# Patient Record
Sex: MALE | Race: WHITE | URBAN - METROPOLITAN AREA
[De-identification: names, ages, dates, MRNs, and addresses within clinical notes are randomized per-mention and may not be internally consistent; named-entity substitution may affect disease eponyms.]

---

## 2017-02-06 ENCOUNTER — OFFICE VISIT (OUTPATIENT)
Dept: UROLOGY | Facility: CLINIC | Age: 42
End: 2017-02-06
Payer: COMMERCIAL

## 2017-02-06 VITALS — OXYGEN SATURATION: 97 % | HEART RATE: 76 BPM | WEIGHT: 219 LBS | BODY MASS INDEX: 31.35 KG/M2 | HEIGHT: 70 IN

## 2017-02-06 DIAGNOSIS — N45.1 EPIDIDYMITIS: ICD-10-CM

## 2017-02-06 DIAGNOSIS — N50.819 TESTICLE PAIN: Primary | ICD-10-CM

## 2017-02-06 LAB
ALBUMIN UR-MCNC: NEGATIVE MG/DL
APPEARANCE UR: CLEAR
BILIRUB UR QL STRIP: NEGATIVE
COLOR UR AUTO: YELLOW
GLUCOSE UR STRIP-MCNC: NEGATIVE MG/DL
HGB UR QL STRIP: ABNORMAL
KETONES UR STRIP-MCNC: NEGATIVE MG/DL
LEUKOCYTE ESTERASE UR QL STRIP: NEGATIVE
NITRATE UR QL: NEGATIVE
PH UR STRIP: 5.5 PH (ref 5–7)
RBC #/AREA URNS AUTO: <1 /HPF (ref 0–2)
SP GR UR STRIP: 1.01 (ref 1–1.03)
URN SPEC COLLECT METH UR: ABNORMAL
UROBILINOGEN UR STRIP-ACNC: 0.2 EU/DL (ref 0.2–1)
WBC #/AREA URNS AUTO: 0 /HPF (ref 0–2)

## 2017-02-06 PROCEDURE — 99202 OFFICE O/P NEW SF 15 MIN: CPT | Performed by: UROLOGY

## 2017-02-06 PROCEDURE — 81001 URINALYSIS AUTO W/SCOPE: CPT | Performed by: UROLOGY

## 2017-02-06 ASSESSMENT — PAIN SCALES - GENERAL: PAINLEVEL: MILD PAIN (3)

## 2017-02-06 NOTE — PROGRESS NOTES
[facility]  Main Office: [address]  [address]  [place] [address]       CHIEF COMPLAINT:  Left epididymitis    HISTORY:   This is a 41 year old gentleman who was diagnosed with left epididymitis in December. He had a scrotal ultrasound performed that showed epididymitis in the tail of the left epididymis. He was treated with doxycycline and his symptoms got better. Previously he was having left testicular pain that was radiating down the left leg. Now he says that the left testicle is somewhat more tender than the right side, but that is normal for him. He has no current symptoms or complaints. No history of urinary symptoms or complaints.      PAST MEDICAL HISTORY: No past medical history on file.    PAST SURGICAL HISTORY:   Past Surgical History   Procedure Laterality Date     C repair cruciate ligament,knee  ~     Rt knee ACL repair     Appendectomy open N/A 2015     Procedure: APPENDECTOMY OPEN;  Surgeon: Maria A Harding MD;  Location: [facility] OR     Replace disk cervical anterior N/A 2016     Procedure: REPLACE DISK CERVICAL ANTERIOR;  Surgeon: Jalen Rasmussen MD;  Location: [facility] OR       FAMILY HISTORY:   Family History   Problem Relation Age of Onset     Hypertension Father       at age 65 of rupt cerebral aneurysm     Family History Negative Mother      Born ~1942     DIABETES Maternal Aunt      Blood Disease Other      cousin with hepatitis       SOCIAL HISTORY:   Social History   Substance Use Topics     Smoking status: Never Smoker      Smokeless tobacco: Never Used     Alcohol Use: 0.0 oz/week     0 Standard drinks or equivalent per week      Comment: 6-7 beers weekly          Allergies   Allergen Reactions     No Known Drug Allergies          Current outpatient prescriptions:      multivitamin, therapeutic with minerals (MULTI-VITAMIN) TABS, Take 1 tablet by mouth daily, Disp: , Rfl:      doxycycline (VIBRAMYCIN) 100 MG capsule, Take 1 capsule (100 mg) by mouth 2  "times daily, Disp: 14 capsule, Rfl: 0     oxyCODONE-acetaminophen (PERCOCET) 5-325 MG per tablet, Take 1-2 tablets by mouth every 4 hours as needed for moderate to severe pain, Disp: 90 tablet, Rfl: 0     hydrOXYzine (VISTARIL) 50 MG capsule, Take 1 capsule (50 mg) by mouth 4 times daily as needed (spasm or pain or itching), Disp: 120 capsule, Rfl: 0     methylPREDNISolone (MEDROL DOSEPAK) 4 MG tablet, Follow package instructions, Disp: 21 tablet, Rfl: 0    Review Of Systems:  Skin: negative  Eyes: negative  Ears/Nose/Throat: negative  Respiratory: No shortness of breath, dyspnea on exertion, cough, or hemoptysis  Cardiovascular: negative  Gastrointestinal: negative  Genitourinary: negative  Musculoskeletal: negative  Neurologic: negative  Psychiatric: negative  Hematologic/Lymphatic/Immunologic: negative  Endocrine: negative      PHYSICAL EXAM:    Pulse 76  Ht 1.778 m (5' 10\")  Wt 99.338 kg (219 lb)  BMI 31.42 kg/m2  SpO2 97%  General appearance: In NAD, conversant  HEENT: Normocephalic and atraumatic, anicteric sclera  Cardiovascular: Not examined  Respiratory: normal, non-labored breathing  Gastrointestinal: negative, Abdomen soft, non-tender, and non-distended.   Musculoskeletal: Not Examined  Peripheral Vascular/extremity: No peripheral edema  Skin: Normal temperature, turgor, and texture. No rash  Psychiatric: Appropriate affect, alert and oriented to person, place, and time    Penis: Normal  Scrotal Skin: Normal  Testicles: Normal to palpation bilaterally, no intratesticular masses bilaterally  Epididymis: Normal bilaterally. No tenderness or evidence of epididymitis on the left side. Normal epididymis. No hernia with Valsalva  Lymphatic: Normal inguinal lymph nodes bilaterally  Digital Rectal Exam:     Cystoscopy: Not done      PSA:     UA RESULTS:  Recent Labs   Lab Test  02/06/17   0829   06/23/15   1522   COLOR  Yellow   < >  Straw   APPEARANCE  Clear   < >  Clear   URINEGLC  Negative   < >  Negative "   URINEBILI  Negative   < >  Negative   URINEKETONE  Negative   < >  Negative   SG  1.015   < >  1.004   UBLD  Trace*   < >  Negative   URINEPH  5.5   < >  7.5*   PROTEIN  Negative   < >  Negative   UROBILINOGEN  0.2   < >   --    NITRITE  Negative   < >  Negative   LEUKEST  Negative   < >  Negative   RBCU   --    --   <1   WBCU   --    --   <1    < > = values in this interval not displayed.       Bladder Scan:     Other Labs:      Imaging Studies: I reviewed his ultrasound images. Probable left epididymitis. Normal testicles with no masses. A small amount of fluid surrounding each testicle.      CLINICAL IMPRESSION:   Left epididymitis, now resolved    PLAN:   He presented left epididymitis that is now resolved. His examination is normal today. He was provided reassurance. He will follow-up with me as needed in the future.      Monroe Barclay MD

## 2017-02-06 NOTE — NURSING NOTE
Pt has L testicle pain that moves to his back.  Sometimes it hurts worse than other times.  Pt has tried doxy.  LINDSEY Garcia, CMA

## 2017-02-06 NOTE — Clinical Note
2017      RE: Jim Marrero  [address] [facility] [address]       [facility]  Main Office: [address]  [address]  [place] [address]       CHIEF COMPLAINT:  Left epididymitis    HISTORY:   This is a 41 year old gentleman who was diagnosed with left epididymitis in December. He had a scrotal ultrasound performed that showed epididymitis in the tail of the left epididymis. He was treated with doxycycline and his symptoms got better. Previously he was having left testicular pain that was radiating down the left leg. Now he says that the left testicle is somewhat more tender than the right side, but that is normal for him. He has no current symptoms or complaints. No history of urinary symptoms or complaints.      PAST MEDICAL HISTORY: No past medical history on file.    PAST SURGICAL HISTORY:   Past Surgical History   Procedure Laterality Date     C repair cruciate ligament,knee  ~     Rt knee ACL repair     Appendectomy open N/A 2015     Procedure: APPENDECTOMY OPEN;  Surgeon: Maria A Harding MD;  Location: [facility] OR     Replace disk cervical anterior N/A 2016     Procedure: REPLACE DISK CERVICAL ANTERIOR;  Surgeon: Jalen Rasmussen MD;  Location: [facility] OR       FAMILY HISTORY:   Family History   Problem Relation Age of Onset     Hypertension Father       at age 65 of rupt cerebral aneurysm     Family History Negative Mother      Born ~[birth year]     DIABETES Maternal Aunt      Blood Disease Other      cousin with hepatitis       SOCIAL HISTORY:   Social History   Substance Use Topics     Smoking status: Never Smoker      Smokeless tobacco: Never Used     Alcohol Use: 0.0 oz/week     0 Standard drinks or equivalent per week      Comment: 6-7 beers weekly          Allergies   Allergen Reactions     No Known Drug Allergies          Current outpatient prescriptions:      multivitamin, therapeutic with minerals (MULTI-VITAMIN) TABS, Take 1 tablet by mouth daily, Disp:  ", Rfl:      doxycycline (VIBRAMYCIN) 100 MG capsule, Take 1 capsule (100 mg) by mouth 2 times daily, Disp: 14 capsule, Rfl: 0     oxyCODONE-acetaminophen (PERCOCET) 5-325 MG per tablet, Take 1-2 tablets by mouth every 4 hours as needed for moderate to severe pain, Disp: 90 tablet, Rfl: 0     hydrOXYzine (VISTARIL) 50 MG capsule, Take 1 capsule (50 mg) by mouth 4 times daily as needed (spasm or pain or itching), Disp: 120 capsule, Rfl: 0     methylPREDNISolone (MEDROL DOSEPAK) 4 MG tablet, Follow package instructions, Disp: 21 tablet, Rfl: 0    Review Of Systems:  Skin: negative  Eyes: negative  Ears/Nose/Throat: negative  Respiratory: No shortness of breath, dyspnea on exertion, cough, or hemoptysis  Cardiovascular: negative  Gastrointestinal: negative  Genitourinary: negative  Musculoskeletal: negative  Neurologic: negative  Psychiatric: negative  Hematologic/Lymphatic/Immunologic: negative  Endocrine: negative      PHYSICAL EXAM:    Pulse 76  Ht 1.778 m (5' 10\")  Wt 99.338 kg (219 lb)  BMI 31.42 kg/m2  SpO2 97%  General appearance: In NAD, conversant  HEENT: Normocephalic and atraumatic, anicteric sclera  Cardiovascular: Not examined  Respiratory: normal, non-labored breathing  Gastrointestinal: negative, Abdomen soft, non-tender, and non-distended.   Musculoskeletal: Not Examined  Peripheral Vascular/extremity: No peripheral edema  Skin: Normal temperature, turgor, and texture. No rash  Psychiatric: Appropriate affect, alert and oriented to person, place, and time    Penis: Normal  Scrotal Skin: Normal  Testicles: Normal to palpation bilaterally, no intratesticular masses bilaterally  Epididymis: Normal bilaterally. No tenderness or evidence of epididymitis on the left side. Normal epididymis. No hernia with Valsalva  Lymphatic: Normal inguinal lymph nodes bilaterally  Digital Rectal Exam:     Cystoscopy: Not done      PSA:     UA RESULTS:  Recent Labs   Lab Test  02/06/17   0829   06/23/15   1522   COLOR  " Yellow   < >  Straw   APPEARANCE  Clear   < >  Clear   URINEGLC  Negative   < >  Negative   URINEBILI  Negative   < >  Negative   URINEKETONE  Negative   < >  Negative   SG  1.015   < >  1.004   UBLD  Trace*   < >  Negative   URINEPH  5.5   < >  7.5*   PROTEIN  Negative   < >  Negative   UROBILINOGEN  0.2   < >   --    NITRITE  Negative   < >  Negative   LEUKEST  Negative   < >  Negative   RBCU   --    --   <1   WBCU   --    --   <1    < > = values in this interval not displayed.       Bladder Scan:     Other Labs:      Imaging Studies: I reviewed his ultrasound images. Probable left epididymitis. Normal testicles with no masses. A small amount of fluid surrounding each testicle.      CLINICAL IMPRESSION:   Left epididymitis, now resolved    PLAN:   He presented left epididymitis that is now resolved. His examination is normal today. He was provided reassurance. He will follow-up with me as needed in the future.      Monroe Barclay MD

## 2017-03-06 ENCOUNTER — OFFICE VISIT (OUTPATIENT)
Dept: OPTOMETRY | Facility: CLINIC | Age: 42
End: 2017-03-06
Payer: COMMERCIAL

## 2017-03-06 DIAGNOSIS — H52.4 HYPEROPIA WITH PRESBYOPIA OF BOTH EYES: Primary | ICD-10-CM

## 2017-03-06 DIAGNOSIS — H52.03 HYPEROPIA WITH PRESBYOPIA OF BOTH EYES: Primary | ICD-10-CM

## 2017-03-06 PROCEDURE — 92015 DETERMINE REFRACTIVE STATE: CPT | Performed by: OPTOMETRIST

## 2017-03-06 PROCEDURE — 92004 COMPRE OPH EXAM NEW PT 1/>: CPT | Performed by: OPTOMETRIST

## 2017-03-06 ASSESSMENT — REFRACTION_MANIFEST
OS_ADD: +1.25
OD_AXIS: 030
OD_AXIS: 020
OD_CYLINDER: +0.25
OD_SPHERE: +2.00
OS_SPHERE: +1.00
OS_CYLINDER: +0.25
METHOD_AUTOREFRACTION: 1
OS_AXIS: 177
OD_ADD: +1.25
OD_SPHERE: +1.75
OD_CYLINDER: +0.25
OS_SPHERE: +1.50
OS_CYLINDER: +0.50
OS_AXIS: 178

## 2017-03-06 ASSESSMENT — REFRACTION_WEARINGRX
SPECS_TYPE: SVL
OD_SPHERE: +1.50
OD_AXIS: 021
OS_SPHERE: +1.50
OD_CYLINDER: +0.50

## 2017-03-06 ASSESSMENT — VISUAL ACUITY
OD_SC: 20/25
OS_CC: 20/60
OS_CC: 20/20
METHOD: SNELLEN - LINEAR
OD_CC: 20/200
OS_SC: 20/20
OD_CC: 20/20

## 2017-03-06 ASSESSMENT — CUP TO DISC RATIO
OS_RATIO: 0.45
OD_RATIO: 0.5

## 2017-03-06 ASSESSMENT — CONF VISUAL FIELD
METHOD: COUNTING FINGERS
OS_NORMAL: 1
OD_NORMAL: 1

## 2017-03-06 ASSESSMENT — TONOMETRY
IOP_METHOD: APPLANATION
OS_IOP_MMHG: 16
OD_IOP_MMHG: 17

## 2017-03-06 ASSESSMENT — SLIT LAMP EXAM - LIDS
COMMENTS: NORMAL
COMMENTS: NORMAL

## 2017-03-06 ASSESSMENT — EXTERNAL EXAM - RIGHT EYE: OD_EXAM: NORMAL

## 2017-03-06 ASSESSMENT — EXTERNAL EXAM - LEFT EYE: OS_EXAM: NORMAL

## 2017-03-06 NOTE — MR AVS SNAPSHOT
After Visit Summary   3/6/2017    Jim Marerro    MRN: [id number]           Patient Information     Date Of Birth          1975        Visit Information        Provider Department      3/6/2017 1:30 PM Magi Ayon OD [facility]        Today's Diagnoses     Hyperopia with presbyopia of both eyes    -  1      Care Instructions    Need additional magnification for near/ recommend a progressive adaptive lens /no line        Follow-ups after your visit        Follow-up notes from your care team     Return in about 1 year (around 3/6/2018).      Who to contact     If you have questions or need follow up information about today's clinic visit or your schedule please contact [facility] directly at [phone].  Normal or non-critical lab and imaging results will be communicated to you by [employer], letter or phone within 4 business days after the clinic has received the results. If you do not hear from us within 7 days, please contact the clinic through [employer] or phone. If you have a critical or abnormal lab result, we will notify you by phone as soon as possible.  Submit refill requests through [employer] or call your pharmacy and they will forward the refill request to us. Please allow 3 business days for your refill to be completed.          Additional Information About Your Visit        MyChart Information     [employer] gives you secure access to your electronic health record. If you see a primary care provider, you can also send messages to your care team and make appointments. If you have questions, please call your primary care clinic.  If you do not have a primary care provider, please call [phone] and they will assist you.        Care EveryWhere ID     This is your Care EveryWhere ID. This could be used by other organizations to access your [facility] medical records  [id number]         Blood Pressure from Last 3 Encounters:   11/09/16 136/85    10/25/16 112/70   05/03/16 110/70    Weight from Last 3 Encounters:   02/06/17 99.3 kg (219 lb)   11/09/16 96.2 kg (212 lb)   10/25/16 96.6 kg (213 lb)              We Performed the Following     EYE EXAM (SIMPLE-NONBILLABLE)     REFRACTION        Primary Care Provider Office Phone # Fax #    Edmund Haynes [phone]       [facility] [address]  [facility] [address]        Thank you!     Thank you for choosing [facility]  for your care. Our goal is always to provide you with excellent care. Hearing back from our patients is one way we can continue to improve our services. Please take a few minutes to complete the written survey that you may receive in the mail after your visit with us. Thank you!             Your Updated Medication List - Protect others around you: Learn how to safely use, store and throw away your medicines at www.disposemymeds.org.      Notice  As of 3/6/2017  2:30 PM    You have not been prescribed any medications.

## 2017-03-06 NOTE — PROGRESS NOTES
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  Glasses 3 years        Distance Vision Acuity: Satisfied with vision    Near Vision Acuity: Not satisfied     Eye Comfort: good  Do you use eye drops? :No:  Occupation or Hobbies: Maintenance              Medical, surgical and family histories reviewed and updated 3/6/2017.       OBJECTIVE: See Ophthalmology exam    ASSESSMENT:    ICD-10-CM    1. Hyperopia with presbyopia of both eyes H52.03 EYE EXAM (SIMPLE-NONBILLABLE)    H52.4 REFRACTION      PLAN:     Patient Instructions   Need additional magnification for near/ recommend a progressive adaptive lens /no line

## 2017-10-04 ENCOUNTER — TELEPHONE (OUTPATIENT)
Dept: INTERNAL MEDICINE | Facility: CLINIC | Age: 42
End: 2017-10-04

## 2017-10-04 NOTE — TELEPHONE ENCOUNTER
Jim Marrero is a 42 year old male  who calls with abdominal pain.    NURSING ASSESSMENT:  The pain began 4 month ago.    Pain scale (0-10): 4/10  LMP  was  N/a.  The pain is described as dull and is located LLQ, which is without radiation.  Symptom associated with the abdominal pain: none.  Patient has not had previous abdominal surgery, including none.  Pain is aggravated by Nothing specific, and relieved by drinking fluids.  Allergies:   Allergies   Allergen Reactions     No Known Drug Allergies        MEDICATIONS:   Taking medication(s) as prescribed? No  Taking over the counter medication(s)? No  Any medication side effects? n/a    Any barriers to taking medication(s) as prescribed?  N/A  Medication(s) improving/managing symptoms?  N/A  Medication reconciliation completed: N/A    NURSING PLAN: Nursing advice to patient Recommended appt this week     RECOMMENDED DISPOSITION:  See in 24 hours - 48hrs   Will comply with recommendation: Yes  If further questions/concerns or if symptoms do not improve, worsen or new symptoms develop, call your PCP or [facility] as soon as possible.    Guideline used:  Telephone Triage Protocols for Nurses, Fourth Edition, Irena Parish RN

## 2017-10-05 ENCOUNTER — OFFICE VISIT (OUTPATIENT)
Dept: INTERNAL MEDICINE | Facility: CLINIC | Age: 42
End: 2017-10-05
Payer: COMMERCIAL

## 2017-10-05 VITALS
SYSTOLIC BLOOD PRESSURE: 128 MMHG | TEMPERATURE: 98.1 F | DIASTOLIC BLOOD PRESSURE: 90 MMHG | BODY MASS INDEX: 32.5 KG/M2 | OXYGEN SATURATION: 95 % | HEART RATE: 89 BPM | WEIGHT: 227 LBS | HEIGHT: 70 IN

## 2017-10-05 DIAGNOSIS — R10.9 FLANK PAIN: Primary | ICD-10-CM

## 2017-10-05 LAB
ALBUMIN UR-MCNC: NEGATIVE MG/DL
APPEARANCE UR: CLEAR
BILIRUB UR QL STRIP: NEGATIVE
COLOR UR AUTO: YELLOW
GLUCOSE UR STRIP-MCNC: NEGATIVE MG/DL
HGB UR QL STRIP: NEGATIVE
KETONES UR STRIP-MCNC: NEGATIVE MG/DL
LEUKOCYTE ESTERASE UR QL STRIP: NEGATIVE
NITRATE UR QL: NEGATIVE
PH UR STRIP: 6 PH (ref 5–7)
SOURCE: NORMAL
SP GR UR STRIP: 1.01 (ref 1–1.03)
UROBILINOGEN UR STRIP-ACNC: 0.2 EU/DL (ref 0.2–1)

## 2017-10-05 PROCEDURE — 99213 OFFICE O/P EST LOW 20 MIN: CPT | Performed by: FAMILY MEDICINE

## 2017-10-05 PROCEDURE — 81003 URINALYSIS AUTO W/O SCOPE: CPT | Performed by: FAMILY MEDICINE

## 2017-10-05 NOTE — MR AVS SNAPSHOT
After Visit Summary   10/5/2017    Jim Marrero    MRN: [id number]           Patient Information     Date Of Birth          1975        Visit Information        Provider Department      10/5/2017 4:00 PM Bert Piedra MD [facility]        Today's Diagnoses     Flank pain    -  1       Follow-ups after your visit        Your next 10 appointments already scheduled     Nov 28, 2017 10:20 AM CST   PHYSICAL with Edmund Haynes MD   [facility] ([facility])    [address]  [facility] [id number] [phone]              Who to contact     If you have questions or need follow up information about today's clinic visit or your schedule please contact [facility] directly at [phone].  Normal or non-critical lab and imaging results will be communicated to you by MyChart, letter or phone within 4 business days after the clinic has received the results. If you do not hear from us within 7 days, please contact the clinic through MyChart or phone. If you have a critical or abnormal lab result, we will notify you by phone as soon as possible.  Submit refill requests through [employer] or call your pharmacy and they will forward the refill request to us. Please allow 3 business days for your refill to be completed.          Additional Information About Your Visit        MyChart Information     [employer] gives you secure access to your electronic health record. If you see a primary care provider, you can also send messages to your care team and make appointments. If you have questions, please call your primary care clinic.  If you do not have a primary care provider, please call [phone] and they will assist you.        Care EveryWhere ID     This is your Care EveryWhere ID. This could be used by other organizations to access your [facility] medical records  [id number]        Your Vitals Were     Pulse Temperature  "Height Pulse Oximetry BMI (Body Mass Index)       89 98.1  F (36.7  C) (Oral) 5' 10\" (1.778 m) 95% 32.57 kg/m2        Blood Pressure from Last 3 Encounters:   10/05/17 128/90   11/09/16 136/85   10/25/16 112/70    Weight from Last 3 Encounters:   10/05/17 227 lb (103 kg)   02/06/17 219 lb (99.3 kg)   11/09/16 212 lb (96.2 kg)              We Performed the Following     UA without Microscopic        Primary Care Provider Office Phone # Fax #    Edmund Haynes [phone]       [address] [address]        Equal Access to Services     [facility] KRISHNA : Hadii grayson careyo Solouann, waaxda luqadaha, qaybta kaalmada adeegyada, waxay shirleyin kp medina . So [facility] [phone].    ATENCIÓN: Si habla español, tiene a ruiz disposición servicios gratuitos de asistencia lingüística. Llame al [phone].    We comply with applicable federal civil rights laws and [place] laws. We do not discriminate on the basis of race, color, national origin, age, disability, sex, sexual orientation, or gender identity.            Thank you!     Thank you for choosing [facility]  for your care. Our goal is always to provide you with excellent care. Hearing back from our patients is one way we can continue to improve our services. Please take a few minutes to complete the written survey that you may receive in the mail after your visit with us. Thank you!             Your Updated Medication List - Protect others around you: Learn how to safely use, store and throw away your medicines at www.disposemymeds.org.      Notice  As of 10/5/2017 11:59 PM    You have not been prescribed any medications.      "

## 2017-10-05 NOTE — PROGRESS NOTES
"SUBJECTIVE:   Jim Marrero is a 42 year old male who  presents today for L flank and groin pain. Symptoms of back pain and voiding in small amounts have been going on for 1month(s).  Hematuria no.  gradual onset and still presentand moderate.  There is no history of fever, chills, nausea or vomiting.  No history of vaginal or penile discharge. This patient does not have a history of urinary tract infections. Patient denies rigors, flank pain, temperature > 101 degrees F. and Vomiting, significant nausea or diarrhea or rash   Has mild peritesticular pain on the ipsilateral side and a hx of epididymitis on that side as well    History reviewed. No pertinent past medical history.  No current outpatient prescriptions on file.     Social History   Substance Use Topics     Smoking status: Never Smoker     Smokeless tobacco: Never Used     Alcohol use 0.0 oz/week     0 Standard drinks or equivalent per week      Comment: 6-7 beers weekly       ROS:   CONSTITUTIONAL:NEGATIVE for fever, chills, change in weight  INTEGUMENTARY/SKIN: NEGATIVE for worrisome rashes, moles or lesions    OBJECTIVE:  /90 (BP Location: Left arm, Patient Position: Sitting, Cuff Size: Adult Large)  Pulse 89  Temp 98.1  F (36.7  C) (Oral)  Ht 5' 10\" (1.778 m)  Wt 227 lb (103 kg)  SpO2 95%  BMI 32.57 kg/m2     GENERAL APPEARANCE: healthy, alert and no distress  RESP: lungs clear to auscultation - no rales, rhonchi or wheezes  CV: regular rates and rhythm, normal S1 S2, no murmur noted  ABDOMEN:  soft, nontender, no HSM or masses and bowel sounds normal  BACK: No CVA tenderness  SKIN: no suspicious lesions or rashes  :no inguinal hernias, normal scrotum, penis,  testes normal    ASSESSMENT:   1. Flank pain  Reviewed imaging in the past and noted neg CT for stones.   UA neg today.   Reassure.   Normal urine today.    Consider MRI L spine if persists to r/o radiculopathy.   Nsaids, ice    - UA without Microscopic    "

## 2017-10-05 NOTE — NURSING NOTE
"Chief Complaint   Patient presents with     Abdominal Pain       Initial /90 (BP Location: Left arm, Patient Position: Sitting, Cuff Size: Adult Large)  Pulse 89  Temp 98.1  F (36.7  C) (Oral)  Ht 5' 10\" (1.778 m)  Wt 227 lb (103 kg)  SpO2 95%  BMI 32.57 kg/m2 Estimated body mass index is 32.57 kg/(m^2) as calculated from the following:    Height as of this encounter: 5' 10\" (1.778 m).    Weight as of this encounter: 227 lb (103 kg).  Medication Reconciliation: complete    "

## 2018-02-21 ENCOUNTER — OFFICE VISIT (OUTPATIENT)
Dept: OPTOMETRY | Facility: CLINIC | Age: 43
End: 2018-02-21
Payer: COMMERCIAL

## 2018-02-21 ENCOUNTER — TELEPHONE (OUTPATIENT)
Dept: OPTOMETRY | Facility: CLINIC | Age: 43
End: 2018-02-21

## 2018-02-21 DIAGNOSIS — H52.4 PRESBYOPIA: ICD-10-CM

## 2018-02-21 DIAGNOSIS — H52.03 HYPERMETROPIA OF BOTH EYES: Primary | ICD-10-CM

## 2018-02-21 PROCEDURE — 92014 COMPRE OPH EXAM EST PT 1/>: CPT | Performed by: OPTOMETRIST

## 2018-02-21 PROCEDURE — 92015 DETERMINE REFRACTIVE STATE: CPT | Performed by: OPTOMETRIST

## 2018-02-21 PROCEDURE — 92310 CONTACT LENS FITTING OU: CPT | Performed by: OPTOMETRIST

## 2018-02-21 ASSESSMENT — REFRACTION_WEARINGRX
OS_AXIS: 007
OS_ADD: +1.25
OD_CYLINDER: +0.25
SPECS_TYPE: PAL
OD_AXIS: 005
OD_ADD: +1.25
OS_CYLINDER: +0.25
OD_SPHERE: +1.75
OS_SPHERE: +1.50

## 2018-02-21 ASSESSMENT — VISUAL ACUITY
OS_SC: 20/25
OS_CC: 20/30
CORRECTION_TYPE: GLASSES
OS_CC: 20/20
OD_CC: 20/20
OD_SC: 20/30
OD_CC: 20/40
METHOD: SNELLEN - LINEAR

## 2018-02-21 ASSESSMENT — REFRACTION_CURRENTRX
OS_SPHERE: +1.75
OD_BRAND: ALCON DAILIES AQUA COMFORT PLUS BC 8.7, D 14.0
OD_SPHERE: +2.00
OS_BRAND: ALCON DAILIES AQUA COMFORT PLUS BC 8.7, D 14.0

## 2018-02-21 ASSESSMENT — REFRACTION_MANIFEST
OS_CYLINDER: +0.25
OS_SPHERE: +1.25
OD_ADD: +1.50
OS_CYLINDER: +0.50
OD_SPHERE: +1.75
OS_ADD: +1.50
OD_SPHERE: +1.75
OS_SPHERE: +1.50
OD_CYLINDER: +0.25
OS_AXIS: 175
OS_AXIS: 175
OD_AXIS: 005
METHOD_AUTOREFRACTION: 1

## 2018-02-21 ASSESSMENT — CONF VISUAL FIELD
OD_NORMAL: 1
OS_NORMAL: 1

## 2018-02-21 ASSESSMENT — KERATOMETRY
OS_K2POWER_DIOPTERS: 45.75
OD_K1POWER_DIOPTERS: 44.75
OS_K1POWER_DIOPTERS: 44.87
OD_AXISANGLE_DEGREES: 85
OD_AXISANGLE2_DEGREES: 175
OS_AXISANGLE2_DEGREES: 176
METHOD_AUTO_MANUAL: AUTOMATED
OD_K2POWER_DIOPTERS: 45.12
OS_AXISANGLE_DEGREES: 86

## 2018-02-21 ASSESSMENT — EXTERNAL EXAM - RIGHT EYE: OD_EXAM: NORMAL

## 2018-02-21 ASSESSMENT — CUP TO DISC RATIO
OS_RATIO: 0.45
OD_RATIO: 0.5

## 2018-02-21 ASSESSMENT — TONOMETRY
OS_IOP_MMHG: 16
OD_IOP_MMHG: 16
IOP_METHOD: APPLANATION

## 2018-02-21 ASSESSMENT — EXTERNAL EXAM - LEFT EYE: OS_EXAM: NORMAL

## 2018-02-21 ASSESSMENT — SLIT LAMP EXAM - LIDS
COMMENTS: NORMAL
COMMENTS: NORMAL

## 2018-02-21 NOTE — MR AVS SNAPSHOT
After Visit Summary   2/21/2018    Jim Marrero    MRN: [id number]           Patient Information     Date Of Birth          1975        Visit Information        Provider Department      2/21/2018 1:00 PM Magi Ayon, [facility]        Today's Diagnoses     Hypermetropia of both eyes    -  1    Presbyopia          Care Instructions    Jim Marrero          1975     [id number]     Procedure      Wearing Schedule 1st Week    Wash hands with oil/perfume free soap.   Day 1    4 hours  Cleanse and disinfect contacts daily.    Day 2    6 hours  Clean__NA_______________________   Day 3    8 hours  Rinse__saline is fine to rinse_______________________   Day 4    8 hours  Disinfect______________________    Day 5    10 hours  Rewet__blink contacts, refresh contacts, ______________________    Day 6    10 hours          Day 7    10 hours  Use only eye drops made for contact lenses.  Return in 1-2 weeks for contact check appointment-Come in wearing your contacts.      Replace daily   Sleeping in contacts is NOT recommended.    Sleeping contact lenses increases the risk of contact lens related problems such as but not limited to corneal ulceration,  infiltrates, conjunctivitis, and neovascularization.  Not all contacts are approved for overnight wear.  Make sure you are using your contacts as they are intended.    Congratulations! You have been fitted with contact lenses.  Please follow these simple steps to insure successful contact lens wear.  1.  If your lenses are uncomfortable, cause redness, pain, or blurry vision discontinue wear immediately and call [facility] for an appointment at [phone].  2. Return to Optometry contact lens checks and yearly eye exams.  3. Never wear lenses longer than the prescribed time.  Maximum wearing time of 12  hours a day.  4. Use only prescribed solutions because mixing brands or types may result in problems.  5.  Never wear a torn, discolored, scratched, or chipped lens for any reason.  6. Always follow your doctor and 's recommendations.  7. Use only water-soluble cosmetics, especially mascara.  8. Always have a current pair of eyeglasses available.  9. Do not wear contacts while soaking in a hot tub or while swimming.  10. Only FDA approved extended wear contacts are suitable for sleeping.    I have read and understand all the enclosed material and have been instructed on insertion, removal, and care of my contact lenses.    X_______________________________________________        Return to clinic 1 week wearing a pair of lenses             Follow-ups after your visit        Who to contact     If you have questions or need follow up information about today's clinic visit or your schedule please contact [facility] directly at [phone].  Normal or non-critical lab and imaging results will be communicated to you by BioArrayhart, letter or phone within 4 business days after the clinic has received the results. If you do not hear from us within 7 days, please contact the clinic through TruTag Techn[employer] or phone. If you have a critical or abnormal lab result, we will notify you by phone as soon as possible.  Submit refill requests through Useful Systems or call your pharmacy and they will forward the refill request to us. Please allow 3 business days for your refill to be completed.          Additional Information About Your Visit        Useful Systems Information     Useful Systems gives you secure access to your electronic health record. If you see a primary care provider, you can also send messages to your care team and make appointments. If you have questions, please call your primary care clinic.  If you do not have a primary care provider, please call [phone] and they will assist you.        Care EveryWhere ID     This is your Care EveryWhere ID. This could be used by other organizations to access your [place]  medical records  [id number]         Blood Pressure from Last 3 Encounters:   10/05/17 128/90   11/09/16 136/85   10/25/16 112/70    Weight from Last 3 Encounters:   10/05/17 103 kg (227 lb)   02/06/17 99.3 kg (219 lb)   11/09/16 96.2 kg (212 lb)              Today, you had the following     No orders found for display       Primary Care Provider Office Phone # Fax #    Edmund Haynes [phone]       [address] [facility] [address]  [facility] [address]        Equal Access to Services     [facility]: Hadii grayson johnston hadasho Solouann, waaxda luqadaha, qaybta kaalmada berny, holley medina . So [facility] [place] [phone].    ATENCIÓN: Si habla español, tiene a ruiz disposición servicios gratuitos de asistencia lingüística. [facility] [phone].    We comply with applicable federal civil rights laws and [place] laws. We do not discriminate on the basis of race, color, national origin, age, disability, sex, sexual orientation, or gender identity.            Thank you!     Thank you for choosing [facility]3] [facility]  for your care. Our goal is always to provide you with excellent care. Hearing back from our patients is one way we can continue to improve our services. Please take a few minutes to complete the written survey that you may receive in the mail after your visit with us. Thank you!             Your Updated Medication List - Protect others around you: Learn how to safely use, store and throw away your medicines at www.disposemymeds.org.      Notice  As of 2/21/2018  1:54 PM    You have not been prescribed any medications.

## 2018-02-21 NOTE — PROGRESS NOTES
Chief Complaint   Patient presents with     COMPREHENSIVE EYE EXAM     Contact Lens Evaluation   glasses are getting scratched due to work environment  He is interested in contacts  And is questioning whether he needs his bifocal, can see up close without it      Previous contact lens wearer? No  Comfort of contact lenses :N/A       Satisfied with current lenses: N/A        Last Eye Exam: 1yr  Dilated Previously: Yes    What are you currently using to see?  glasses    Distance Vision Acuity: Satisfied with vision    Near Vision Acuity: Satisfied with vision while reading  with glasses    Eye Comfort: good  Do you use eye drops? : No  Occupation or Hobbies: [employer]           Medical, surgical and family histories reviewed and updated 2/21/2018.       OBJECTIVE: See Ophthalmology exam    ASSESSMENT:    ICD-10-CM    1. Hypermetropia of both eyes H52.03    2. Presbyopia H52.4         PLAN:   Fit with daily contact lenses, patient was able to insert and remove contact lenses in office.   return to clinic for follow up wearing a pair in for the appointment  Use readers as needed over contacts   Finalize prescription at time of follow up    Magi Ayon OD

## 2018-02-21 NOTE — LETTER
2/21/2018         RE: Jim Marrero  [address] [address]        Dear Colleague,    [facility] you [facility] Please see a copy of my visit note below.    Chief Complaint   Patient presents with     COMPREHENSIVE EYE EXAM     Contact Lens Evaluation   glasses are getting scratched due to work environment  He is interested in contacts  And is questioning whether he needs his bifocal, can see up close without it      Previous contact lens wearer? No  Comfort of contact lenses :N/A       Satisfied with current lenses: N/A        Last Eye Exam: 1yr  Dilated Previously: Yes    What are you currently using to see?  glasses    Distance Vision Acuity: Satisfied with vision    Near Vision Acuity: Satisfied with vision while reading  with glasses    Eye Comfort: good  Do you use eye drops? : No  Occupation or Hobbies: [employer]           Medical, surgical and family histories reviewed and updated 2/21/2018.       OBJECTIVE: See Ophthalmology exam    ASSESSMENT:    ICD-10-CM    1. Hypermetropia of both eyes H52.03    2. Presbyopia H52.4         PLAN:   Fit with daily contact lenses, patient was able to insert and remove contact lenses in office.   return to clinic for follow up wearing a pair in for the appointment  Use readers as needed over contacts   Finalize prescription at time of follow up    Magi Ayon OD                     Again, thank you for allowing me to participate in the care of your patient.        Sincerely,        Magi Ayon, OD

## 2018-02-21 NOTE — TELEPHONE ENCOUNTER
2/21/18 Patient successfully performed I&R and was given wearing schedule.  Wojciech Alexander  Optometric Technician

## 2018-02-21 NOTE — PATIENT INSTRUCTIONS
Jim Marrero          1975     [id number]     Procedure      Wearing Schedule 1st Week    Wash hands with oil/perfume free soap.   Day 1    4 hours  Cleanse and disinfect contacts daily.    Day 2    6 hours  Clean__NA_______________________   Day 3    8 hours  Rinse__saline is fine to rinse_______________________   Day 4    8 hours  Disinfect______________________    Day 5    10 hours  Rewet__blink contacts, refresh contacts, ______________________    Day 6    10 hours          Day 7    10 hours  Use only eye drops made for contact lenses.  Return in 1-2 weeks for contact check appointment-Come in wearing your contacts.      Replace daily   Sleeping in contacts is NOT recommended.    Sleeping contact lenses increases the risk of contact lens related problems such as but not limited to corneal ulceration,  infiltrates, conjunctivitis, and neovascularization.  Not all contacts are approved for overnight wear.  Make sure you are using your contacts as they are intended.    Congratulations! You have been fitted with contact lenses.  Please follow these simple steps to insure successful contact lens wear.  1.  If your lenses are uncomfortable, cause redness, pain, or blurry vision discontinue wear immediately and call [facility] for an appointment at [phone].  2. Return to Optometry contact lens checks and yearly eye exams.  3. Never wear lenses longer than the prescribed time.  Maximum wearing time of 12  hours a day.  4. Use only prescribed solutions because mixing brands or types may result in problems.  5. Never wear a torn, discolored, scratched, or chipped lens for any reason.  6. Always follow your doctor and 's recommendations.  7. Use only water-soluble cosmetics, especially mascara.  8. Always have a current pair of eyeglasses available.  9. Do not wear contacts while soaking in a hot tub or while swimming.  10. Only FDA approved extended wear contacts are  suitable for sleeping.    I have read and understand all the enclosed material and have been instructed on insertion, removal, and care of my contact lenses.    X_______________________________________________        Return to clinic 1 week wearing a pair of lenses

## 2018-02-28 ENCOUNTER — OFFICE VISIT (OUTPATIENT)
Dept: OPTOMETRY | Facility: CLINIC | Age: 43
End: 2018-02-28
Payer: COMMERCIAL

## 2018-02-28 DIAGNOSIS — H52.03 HYPERMETROPIA OF BOTH EYES: Primary | ICD-10-CM

## 2018-02-28 PROCEDURE — 92499 UNLISTED OPH SVC/PROCEDURE: CPT | Performed by: OPTOMETRIST

## 2018-02-28 PROCEDURE — 99207 ZZC NO BILLABLE SERVICE THIS VISIT: CPT | Performed by: OPTOMETRIST

## 2018-02-28 ASSESSMENT — REFRACTION_CURRENTRX
OD_SPHERE: +2.00
OS_BRAND: ALCON DAILIES AQUA COMFORT PLUS BC 8.7, D 14.0
OS_SPHERE: +1.75
OD_BRAND: ALCON DAILIES AQUA COMFORT PLUS BC 8.7, D 14.0

## 2018-02-28 NOTE — MR AVS SNAPSHOT
After Visit Summary   2/28/2018    Jim Marrero    MRN: [id number]           Patient Information     Date Of Birth          1975        Visit Information        Provider Department      2/28/2018 5:20 PM Magi Ayon OD [facility]        Today's Diagnoses     Hypermetropia of both eyes    -  1      Care Instructions    You can order your contact lenses online at [email or web address].  Click on services at the top of the page, then eye care and you will see the link to order contact lenses.  You can also order contact lenses at [phone]. There is no shipping fee if you order an annual supply otherwise  be sure to let them know which office you would like to  the lenses, [facility] [phone].    May use readers over contacts +1.25 as needed           Follow-ups after your visit        Follow-up notes from your care team     Return if symptoms worsen or fail to improve.      Your next 10 appointments already scheduled     Feb 28, 2018  5:20 PM CST   Return Visit with Magi Ayon OD   [facility] ([facility])    [address] [place] [address]  [address]  [facility] [address] [phone]              Who to contact     If you have questions or need follow up information about today's clinic visit or your schedule please contact [facility] directly at [phone].  Normal or non-critical lab and imaging results will be communicated to you by MyChart, letter or phone within 4 business days after the clinic has received the results. If you do not hear from us within 7 days, please contact the clinic through MyChart or phone. If you have a critical or abnormal lab result, we will notify you by phone as soon as possible.  Submit refill requests through SiOx or call your pharmacy and they will forward the refill request to us. Please allow 3 business days for your refill to be completed.          Additional  Information About Your Visit        Sleep Solutionshart Information     [employer]able gives you secure access to your electronic health record. If you see a primary care provider, you can also send messages to your care team and make appointments. If you have questions, please call your primary care clinic.  If you do not have a primary care provider, please call [phone] and they will assist you.        Care EveryWhere ID     This is your Care EveryWhere ID. This could be used by other organizations to access your [place] medical records  [id number]         Blood Pressure from Last 3 Encounters:   10/05/17 128/90   11/09/16 136/85   10/25/16 112/70    Weight from Last 3 Encounters:   10/05/17 103 kg (227 lb)   02/06/17 99.3 kg (219 lb)   11/09/16 96.2 kg (212 lb)              Today, you had the following     No orders found for display       Primary Care Provider Office Phone # Fax #    Edmund Haynes [phone]       [address] [facility] [address]  [facility] [address]        Equal Access to Services     [facility]: Hadii aad ku hadasho Soomaali, waaxda luqadaha, qaybta kaalmada adeegyada, waxay idiin haymundon edwige medina . So [facility] [phone].    ATENCIÓN: Si habla español, tiene a ruiz disposición servicios gratuitos de asistencia lingüística. Llame al [phone].    We comply with applicable federal civil rights laws and [place] laws. We do not discriminate on the basis of race, color, national origin, age, disability, sex, sexual orientation, or gender identity.            Thank you!     Thank you for choosing [facility]  for your care. Our goal is always to provide you with excellent care. Hearing back from our patients is one way we can continue to improve our services. Please take a few minutes to complete the written survey that you may receive in the mail after your visit with us. Thank you!             Your Updated Medication List - Protect others around you: Learn how to safely  use, store and throw away your medicines at www.disposemymeds.org.      Notice  As of 2/28/2018  5:19 PM    You have not been prescribed any medications.

## 2018-02-28 NOTE — LETTER
2/28/2018         RE: Jim Marrero  [address] [facility] [address]        Dear Colleague,    Thank you for referring your patient, Jim Marrero, to the [facility]. Please see a copy of my visit note below.    Chief Complaint   Patient presents with     Contact Lens Check     Follow up     Satisfied with contacts:  They get a bit filmy at the end of the day  Bought some rewetting drops and has used   Good comfort:  Yes                Medical, surgical and family histories reviewed and updated 2/28/2018.       OBJECTIVE: See Ophthalmology exam    ASSESSMENT:    ICD-10-CM    1. Hypermetropia of both eyes H52.03     good fit visual acuity with current lens    PLAN:  Use readers over contacts as needed   And rewetting drops as needed  Wear time 10-12 hours    Magi Ayon OD                     Again, thank you for allowing me to participate in the care of your patient.        Sincerely,        Magi Ayon, OD

## 2018-02-28 NOTE — PATIENT INSTRUCTIONS
You can order your contact lenses online at [email or web address].  Click on services at the top of the page, then eye care and you will see the link to order contact lenses.  You can also order contact lenses at [phone]. There is no shipping fee if you order an annual supply otherwise  be sure to let them know which office you would like to  the lenses, [facility] [phone].    May use readers over contacts +1.25 as needed

## 2018-02-28 NOTE — PROGRESS NOTES
Chief Complaint   Patient presents with     Contact Lens Check     Follow up     Satisfied with contacts:  They get a bit filmy at the end of the day  Bought some rewetting drops and has used   Good comfort:  Yes                Medical, surgical and family histories reviewed and updated 2/28/2018.       OBJECTIVE: See Ophthalmology exam    ASSESSMENT:    ICD-10-CM    1. Hypermetropia of both eyes H52.03     good fit visual acuity with current lens    PLAN:  Use readers over contacts as needed   And rewetting drops as needed  Wear time 10-12 hours    Magi Ayon OD

## 2018-04-16 ENCOUNTER — OFFICE VISIT (OUTPATIENT)
Dept: PEDIATRICS | Facility: CLINIC | Age: 43
End: 2018-04-16
Payer: COMMERCIAL

## 2018-04-16 ENCOUNTER — TELEPHONE (OUTPATIENT)
Dept: INTERNAL MEDICINE | Facility: CLINIC | Age: 43
End: 2018-04-16

## 2018-04-16 VITALS
DIASTOLIC BLOOD PRESSURE: 80 MMHG | OXYGEN SATURATION: 98 % | TEMPERATURE: 98.5 F | BODY MASS INDEX: 32.14 KG/M2 | SYSTOLIC BLOOD PRESSURE: 118 MMHG | HEART RATE: 96 BPM | WEIGHT: 224 LBS

## 2018-04-16 DIAGNOSIS — M54.50 LEFT-SIDED LOW BACK PAIN WITHOUT SCIATICA, UNSPECIFIED CHRONICITY: Primary | ICD-10-CM

## 2018-04-16 LAB
ALBUMIN UR-MCNC: NEGATIVE MG/DL
AMORPH CRY #/AREA URNS HPF: ABNORMAL /HPF
APPEARANCE UR: NORMAL
BACTERIA #/AREA URNS HPF: ABNORMAL /HPF
BILIRUB UR QL STRIP: NEGATIVE
COLOR UR AUTO: YELLOW
GLUCOSE UR STRIP-MCNC: NEGATIVE MG/DL
HGB UR QL STRIP: NEGATIVE
KETONES UR STRIP-MCNC: NEGATIVE MG/DL
LEUKOCYTE ESTERASE UR QL STRIP: NEGATIVE
NITRATE UR QL: NEGATIVE
PH UR STRIP: 7 PH (ref 5–7)
RBC #/AREA URNS AUTO: ABNORMAL /HPF
SOURCE: NORMAL
SP GR UR STRIP: 1.02 (ref 1–1.03)
UROBILINOGEN UR STRIP-ACNC: 0.2 EU/DL (ref 0.2–1)
WBC #/AREA URNS AUTO: ABNORMAL /HPF

## 2018-04-16 PROCEDURE — 81001 URINALYSIS AUTO W/SCOPE: CPT | Performed by: INTERNAL MEDICINE

## 2018-04-16 PROCEDURE — 99214 OFFICE O/P EST MOD 30 MIN: CPT | Performed by: INTERNAL MEDICINE

## 2018-04-16 NOTE — MR AVS SNAPSHOT
After Visit Summary   4/16/2018    Jim Marrero    MRN: [id number]           Patient Information     Date Of Birth          1975        Visit Information        Provider Department      4/16/2018 10:00 AM José Miguel Spence MD [facility]        Today's Diagnoses     Left-sided low back pain without sciatica, unspecified chronicity    -  1    Left flank pain          Care Instructions    INSTRUCTIONS FOR TODAY:     schedule visit with PT   follow-up after 3-4 PT sessions if not better     Dr Spence    When You Have Low Back Pain    Caring for Your Back  You are not alone.    Low back pain is very common. Nearly half of all adults have low back pain in any given year. The good news is that back pain is rarely a danger to your health. Most people can manage their back pain on their own and about half of them start feeling better within 2 weeks. In 9 out of 10 cases, low back pain goes away or no longer limits daily activity within 6 weeks.     Your outlook is good!    Your symptoms tell us that your low back pain is most likely not a danger to you. Most of the time we do not know the exact cause of low back pain, even if you see a doctor or have an MRI. However, treatment can still work without knowing the cause of the pain. Less than 1 in 100 people need surgery for their back pain.     What can I do about my low back pain?     There are three things you can do to ease low back pain and help it go away.    Use heat or cold packs.    Take medicine as directed.    Use positions, movements and exercises.     Using heat or cold packs    Try cold packs or gentle heat to ease your pain. Use whichever gives you the most relief. Apply the cold pack or heat for 15 minutes at a time, as often as needed.    Taking medicine      If your doctor has prescribed medicine, be sure to follow the directions.    If you take over-the-counter medicine, read and follow the directions.    Talk to your  doctor if you have any questions.     Using positions, movements and exercises    Research tells us that moving your joints and muscles can help you recover from back pain. Such activity should be simple and gentle. Use the positions in the photos as well as walking to help relieve your pain. Try taking a short walk every 3 to 4 hours during the day. Walk for a few minutes inside your home or take longer walks outside, on a treadmill or at a mall. Slowly increase the amount of time you walk. Expect discomfort when you begin, but it should lessen as your back starts to heal. When your back feels better, walk daily to keep your back and body healthy.    Finding a comfortable position    When your back pain is new, certain positions will ease your pain. Gently try each of the positions below until you find one that is helpful. Once you find a position of comfort, use it as often as you like when you are resting. You will recover faster if you combine rest with activity.         Lie on your back with your legs bent. You can do this by placing a pillow under your knees. Or you may lie on the floor and rest your lower legs on the seat of a chair.       Lie on your side with your knees bent, and place a pillow between your knees.       Lie on your stomach over pillows.      When should I call my doctor?    Your back pain should improve over the first couple of weeks. As it improves, you should be able to return to your normal activities. But call your doctor if:    You have a sudden change in your ability to control your bladder or bowels.    You feel tingling in your groin or legs.    The pain spreads down your leg and into your foot.    Your toes, feet or leg muscles feel weak.    You feel generally unwell or sick.    Your pain does not get better or gets worse.      If you are deaf or hard of hearing, please let us know. We provide many free services including sign language interpreters,oral interpreters, TTYs,  telephone amplifiers, note takers and written materials.    For informational purposes only. Not to replace the advice of your health care provider. Copyright   2013 [facility]. All rights reserved. [employer] 838111 - Rev 06/14.              Follow-ups after your visit        Additional Services     [facility] PT, HAND, AND CHIROPRACTIC REFERRAL       **This order will print in the [facility] Scheduling Office**    Physical Therapy, Hand Therapy and Chiropractic Care are available through:    *[facility]  *[facility]  *[facility]    Call one number to schedule at any of the above locations: [phone].    Your provider has referred you to: Physical Therapy at [facility] or [facility]    Indication/Reason for Referral: back pain  Onset of Illness:   Therapy Orders: Evaluate and Treat  Special Programs: None  Special Request: None    Delvin Hargrove      Additional Comments for the Therapist or Chiropractor:     Please be aware that coverage of these services is subject to the terms and limitations of your health insurance plan.  Call member services at your health plan with any benefit or coverage questions.      Please bring the following to your appointment:    *Your personal calendar for scheduling future appointments  *Comfortable clothing                  Your next 10 appointments already scheduled     May 23, 2018  8:00 AM CDT   PHYSICAL with Edmund Haynes MD   [facility]    [address]  [facility] [address] [phone]              Who to contact     If you have questions or need follow up information about today's clinic visit or your schedule please contact [facility] directly at [phone].  Normal or non-critical lab and imaging results will be communicated to you by MyChart, letter or phone within 4 business days after the clinic has received the results. If you do not hear from  us within 7 days, please contact the clinic through [employer] or phone. If you have a critical or abnormal lab result, we will notify you by phone as soon as possible.  Submit refill requests through [employer] or call your pharmacy and they will forward the refill request to us. Please allow 3 business days for your refill to be completed.          Additional Information About Your Visit        [employer] Information     [employer] gives you secure access to your electronic health record. If you see a primary care provider, you can also send messages to your care team and make appointments. If you have questions, please call your primary care clinic.  If you do not have a primary care provider, please call [phone] and they will assist you.        Care EveryWhere ID     This is your Care EveryWhere ID. This could be used by other organizations to access your [place] medical records  [id number]        Your Vitals Were     Pulse Temperature Pulse Oximetry BMI (Body Mass Index)          96 98.5  F (36.9  C) (Oral) 98% 32.14 kg/m2         Blood Pressure from Last 3 Encounters:   04/16/18 118/80   10/05/17 128/90   11/09/16 136/85    Weight from Last 3 Encounters:   04/16/18 224 lb (101.6 kg)   10/05/17 227 lb (103 kg)   02/06/17 219 lb (99.3 kg)              We Performed the Following     [facility] PT, HAND, AND CHIROPRACTIC REFERRAL     UA reflex to Microscopic     Urine Microscopic        Primary Care Provider Office Phone # Fax #    Edmund Haynes [phone]       [address] [facility] [address]  [facility] [address]        Equal Access to Services     [facility] Bern[facility] campusDESTINI : Hadii aad ku hadasho Soomaali, waaxda luqadaha, qaybta kaalmada adeegyada, holley medina . So [facility] [place] [facility] [phone].    ATENCIÓN: Si habla español, tiene a ruiz disposición servicios gratuitos de asistencia lingüística. Llame al [phone].    We comply with applicable federal civil rights laws and [place] laws. We do not  discriminate on the basis of race, color, national origin, age, disability, sex, sexual orientation, or gender identity.            Thank you!     Thank you for choosing [facility]  for your care. Our goal is always to provide you with excellent care. Hearing back from our patients is one way we can continue to improve our services. Please take a few minutes to complete the written survey that you may receive in the mail after your visit with us. Thank you!             Your Updated Medication List - Protect others around you: Learn how to safely use, store and throw away your medicines at www.disposemymeds.org.      Notice  As of 4/16/2018 11:06 AM    You have not been prescribed any medications.

## 2018-04-16 NOTE — PROGRESS NOTES
SUBJECTIVE:   Jim Marrero is a 43 year old male who presents to clinic today for the following health issues:    Abdominal/flank Pain      Duration:    43-year-old  presents today for evaluation of left-sided abdominal and groin pain for the past few weeks.  Patient describes pain radiating from his left testicle towards his left groin and left flank.  Denies dysuria fevers or chills.  His wife noted abnormal urine color recently and question of blood in urine.  Patient had similar episode last fall.  Urinalysis at the time was negative.  Patient does have a prior history of left epididymitis in 2016 previously treated and evaluated through urology.   Pain is described as an aching sensation which does not radiate to the leg and denies numbness paresthesias or weakness of the left lower extremity.  Pain seems to be positional.    Description (location/character/radiation): mainly left side       Associated flank pain: yes    Intensity:  moderate    Accompanying signs and symptoms:        Fever/Chills: no        Gas/Bloating: no        Nausea/vomitting: no        Diarrhea: no        Dysuria or Hematuria: maybe    History (previous similar pain/trauma/previous testing): same pain last fall, but went away    Precipitating or alleviating factors:       Pain worse with eating/BM/urination:        Pain relieved by BM: no     Therapies tried and outcome: None    LMP:  not applicable      Patient Active Problem List   Diagnosis     Cervicalgia     CARDIOVASCULAR SCREENING; LDL GOAL LESS THAN 130     Past Surgical History:   Procedure Laterality Date     APPENDECTOMY OPEN N/A 6/23/2015    Procedure: APPENDECTOMY OPEN;  Surgeon: Maria A Harding MD;  Location:  OR      REPAIR CRUCIATE LIGAMENT,KNEE  ~2001    Rt knee ACL repair     REPLACE DISK CERVICAL ANTERIOR N/A 11/9/2016    Procedure: REPLACE DISK CERVICAL ANTERIOR;  Surgeon: Jalen Rasmussen MD;  Location:  OR       [facility]  History   Substance Use Topics     Smoking status: Never Smoker     Smokeless tobacco: Never Used     Alcohol use 0.0 oz/week     0 Standard drinks or equivalent per week      Comment: 6-7 beers weekly     Family History   Problem Relation Age of Onset     Hypertension Father       at age 65 of rupt cerebral aneurysm     Family History Negative Mother      Born ~1942     Glaucoma Mother      DIABETES Maternal Aunt      Blood Disease Other      cousin with hepatitis     Macular Degeneration No family hx of          No current outpatient prescriptions on file.       ROS: The following systems have been completely reviewed and are negative except as noted in the HPI: CONSTITUTIONAL,  GASTROINTESTINAL, GENITOURINARY, NEUROLOGIC, ENDOCRINE, PSYCHIATRIC.     OBJECTIVE:                                                    /80 (Cuff Size: Adult Large)  Pulse 96  Temp 98.5  F (36.9  C) (Oral)  Wt 224 lb (101.6 kg)  SpO2 98%  BMI 32.14 kg/m2 Body mass index is 32.14 kg/(m^2).  GENERAL:  alert,  no distress  NECK: no tenderness, no adenopathy  RESP: lungs clear to auscultation - no rales, no rhonchi, no wheezes  CV: regular rates and rhythm, normal S1 S2, no S3 or S4 and no murmur, no click or rub -  ABDOMEN: soft, no tenderness, no  hepatosplenomegaly, no masses, normal bowel sounds  Back: Without midline lumbar spinous tenderness, without CVA tenderness, generalized tenderness to palpation mid/lower back on the left  Genitourinary: Normal testicular exam, negative hernia check bilaterally  MS: extremities- no edema     Results for orders placed or performed in visit on 18   UA reflex to Microscopic   Result Value Ref Range    Color Urine Yellow     Appearance Urine Slightly Cloudy     Glucose Urine Negative NEG^Negative mg/dL    Bilirubin Urine Negative NEG^Negative    Ketones Urine Negative NEG^Negative mg/dL    Specific Gravity Urine 1.020 1.003 - 1.035    Blood Urine Negative NEG^Negative    pH Urine  7.0 5.0 - 7.0 pH    Protein Albumin Urine Negative NEG^Negative mg/dL    Urobilinogen Urine 0.2 0.2 - 1.0 EU/dL    Nitrite Urine Negative NEG^Negative    Leukocyte Esterase Urine Negative NEG^Negative    Source Midstream Urine    Urine Microscopic   Result Value Ref Range    WBC Urine 0 - 5 OTO5^0 - 5 /HPF    RBC Urine O - 2 OTO2^O - 2 /HPF    Bacteria Urine Moderate (A) NEG^Negative /HPF    Amorphous Crystals Many (A) NEG^Negative /HPF     ASSESSMENT/PLAN:                                                        ICD-10-CM    1. Left-sided low back pain without sciatica, unspecified chronicity M54.5 [facility] PT, HAND, AND CHIROPRACTIC REFERRAL      Left sided flank/back pain.  Differential considered: Renal colic, epididymitis , UTI/pyelonephritis, diverticular disease, musculoskeletal low back pain, lumbar radiculopathy.  Lab work: Urinalysis today negative for hematuria nitrite or leukocyte esterase.   Renal stone or other urinary pathology less likely based on exam and lab work.  Suspect musculoskeletal pain, also consider L1-L2 radiculopathy.  Recommended trial of physical therapy for 3-4 sessions.  Pt is given a handout which reviews relevant stretching and strengthening exercises.   If symptoms persist at follow-up consider additional imaging-MRI to rule out disc pathology    José Miguel Spence MD  [facility]

## 2018-04-16 NOTE — PATIENT INSTRUCTIONS
INSTRUCTIONS FOR TODAY:     schedule visit with PT   follow-up after 3-4 PT sessions if not better     Dr Spence    When You Have Low Back Pain    Caring for Your Back  You are not alone.    Low back pain is very common. Nearly half of all adults have low back pain in any given year. The good news is that back pain is rarely a danger to your health. Most people can manage their back pain on their own and about half of them start feeling better within 2 weeks. In 9 out of 10 cases, low back pain goes away or no longer limits daily activity within 6 weeks.     Your outlook is good!    Your symptoms tell us that your low back pain is most likely not a danger to you. Most of the time we do not know the exact cause of low back pain, even if you see a doctor or have an MRI. However, treatment can still work without knowing the cause of the pain. Less than 1 in 100 people need surgery for their back pain.     What can I do about my low back pain?     There are three things you can do to ease low back pain and help it go away.    Use heat or cold packs.    Take medicine as directed.    Use positions, movements and exercises.     Using heat or cold packs    Try cold packs or gentle heat to ease your pain. Use whichever gives you the most relief. Apply the cold pack or heat for 15 minutes at a time, as often as needed.    Taking medicine      If your doctor has prescribed medicine, be sure to follow the directions.    If you take over-the-counter medicine, read and follow the directions.    Talk to your doctor if you have any questions.     Using positions, movements and exercises    Research tells us that moving your joints and muscles can help you recover from back pain. Such activity should be simple and gentle. Use the positions in the photos as well as walking to help relieve your pain. Try taking a short walk every 3 to 4 hours during the day. Walk for a few minutes inside your home or take longer walks outside, on a  treadmill or at a mall. Slowly increase the amount of time you walk. Expect discomfort when you begin, but it should lessen as your back starts to heal. When your back feels better, walk daily to keep your back and body healthy.    Finding a comfortable position    When your back pain is new, certain positions will ease your pain. Gently try each of the positions below until you find one that is helpful. Once you find a position of comfort, use it as often as you like when you are resting. You will recover faster if you combine rest with activity.         Lie on your back with your legs bent. You can do this by placing a pillow under your knees. Or you may lie on the floor and rest your lower legs on the seat of a chair.       Lie on your side with your knees bent, and place a pillow between your knees.       Lie on your stomach over pillows.      When should I call my doctor?    Your back pain should improve over the first couple of weeks. As it improves, you should be able to return to your normal activities. But call your doctor if:    You have a sudden change in your ability to control your bladder or bowels.    You feel tingling in your groin or legs.    The pain spreads down your leg and into your foot.    Your toes, feet or leg muscles feel weak.    You feel generally unwell or sick.    Your pain does not get better or gets worse.      If you are deaf or hard of hearing, please let us know. We provide many free services including sign language interpreters,oral interpreters, TTYs, telephone amplifiers, note takers and written materials.    For informational purposes only. Not to replace the advice of your health care provider. Copyright   2013 [facility]. All rights reserved. [employer] 621165 - Rev 06/14.

## 2018-04-16 NOTE — TELEPHONE ENCOUNTER
Jim Marrero is a 43 year old male  who calls with abdominal pain.    NURSING ASSESSMENT:  The pain returned 1 month ago. Had similar pain last fall, was seen in 10/5/17 by Dr. Piedra. Pain resolved after the appt, but returned about a month ago and now is worse then it was in October.  Pain scale (0-10): 6/10  The pain is described as aching and is located LLQ, which is with radiation to back by kidney.  Symptom associated with the abdominal pain: swollen lymph nodes in throat and intermittent left testicular pain.  Patient has had previous abdominal surgery, including appendectomy.  Pain is aggravated by movement and activity, and relieved by recumbency. Tried increasing fluids, but this has not helped.  Allergies:   Allergies   Allergen Reactions     No Known Drug Allergies        MEDICATIONS:   Taking medication(s) as prescribed? N/A  Taking over the counter medication(s)? No  Any medication side effects? Not Applicable    Any barriers to taking medication(s) as prescribed?  N/A  Medication(s) improving/managing symptoms?  N/A  Medication reconciliation completed: Yes    NURSING PLAN: Nursing advice to patient schedule an appt    RECOMMENDED DISPOSITION:  See in 24 hours - no appts in our office until 4/18, offered appt in [place] with Dr. Spence at 10am and pt agrees.   Next 5 appointments (look out 90 days)     Apr 16, 2018 10:00 AM CDT   Office Visit with José Miguel Spence MD   [facility]    [address]  [address]  [facility] [address]   [phone]            May 23, 2018  8:00 AM CDT   PHYSICAL with Edmund Haynes MD   [facility]    [address] [place] [id number] [phone]                 Will comply with recommendation: Yes  If further questions/concerns or if symptoms do not improve, worsen or new symptoms develop, call your PCP or [facility] as soon as  possible.    Guideline used:  Telephone Triage Protocols for Nurses, Fourth Edition, Irena Raphael RN

## 2018-05-23 ENCOUNTER — OFFICE VISIT (OUTPATIENT)
Dept: INTERNAL MEDICINE | Facility: CLINIC | Age: 43
End: 2018-05-23
Payer: COMMERCIAL

## 2018-05-23 VITALS
TEMPERATURE: 98.6 F | RESPIRATION RATE: 16 BRPM | SYSTOLIC BLOOD PRESSURE: 118 MMHG | DIASTOLIC BLOOD PRESSURE: 78 MMHG | OXYGEN SATURATION: 99 % | BODY MASS INDEX: 32.51 KG/M2 | HEIGHT: 69 IN | WEIGHT: 219.5 LBS | HEART RATE: 78 BPM

## 2018-05-23 DIAGNOSIS — Z13.6 CARDIOVASCULAR SCREENING; LDL GOAL LESS THAN 130: ICD-10-CM

## 2018-05-23 DIAGNOSIS — Z00.00 ROUTINE GENERAL MEDICAL EXAMINATION AT A HEALTH CARE FACILITY: Primary | ICD-10-CM

## 2018-05-23 DIAGNOSIS — M79.671 RIGHT FOOT PAIN: ICD-10-CM

## 2018-05-23 LAB
ALBUMIN SERPL-MCNC: 4 G/DL (ref 3.4–5)
ALP SERPL-CCNC: 54 U/L (ref 40–150)
ALT SERPL W P-5'-P-CCNC: 29 U/L (ref 0–70)
ANION GAP SERPL CALCULATED.3IONS-SCNC: 8 MMOL/L (ref 3–14)
AST SERPL W P-5'-P-CCNC: 10 U/L (ref 0–45)
BILIRUB SERPL-MCNC: 0.5 MG/DL (ref 0.2–1.3)
BUN SERPL-MCNC: 14 MG/DL (ref 7–30)
CALCIUM SERPL-MCNC: 8.7 MG/DL (ref 8.5–10.1)
CHLORIDE SERPL-SCNC: 107 MMOL/L (ref 94–109)
CHOLEST SERPL-MCNC: 243 MG/DL
CO2 SERPL-SCNC: 29 MMOL/L (ref 20–32)
CREAT SERPL-MCNC: 0.85 MG/DL (ref 0.66–1.25)
ERYTHROCYTE [DISTWIDTH] IN BLOOD BY AUTOMATED COUNT: 13.2 % (ref 10–15)
GFR SERPL CREATININE-BSD FRML MDRD: >90 ML/MIN/1.7M2
GLUCOSE SERPL-MCNC: 91 MG/DL (ref 70–99)
HCT VFR BLD AUTO: 41.9 % (ref 40–53)
HDLC SERPL-MCNC: 62 MG/DL
HGB BLD-MCNC: 14.1 G/DL (ref 13.3–17.7)
LDLC SERPL CALC-MCNC: 163 MG/DL
MCH RBC QN AUTO: 29.1 PG (ref 26.5–33)
MCHC RBC AUTO-ENTMCNC: 33.7 G/DL (ref 31.5–36.5)
MCV RBC AUTO: 87 FL (ref 78–100)
NONHDLC SERPL-MCNC: 181 MG/DL
PLATELET # BLD AUTO: 252 10E9/L (ref 150–450)
POTASSIUM SERPL-SCNC: 4.3 MMOL/L (ref 3.4–5.3)
PROT SERPL-MCNC: 7.4 G/DL (ref 6.8–8.8)
RBC # BLD AUTO: 4.84 10E12/L (ref 4.4–5.9)
SODIUM SERPL-SCNC: 144 MMOL/L (ref 133–144)
TRIGL SERPL-MCNC: 88 MG/DL
TSH SERPL DL<=0.005 MIU/L-ACNC: 1.67 MU/L (ref 0.4–4)
WBC # BLD AUTO: 4.3 10E9/L (ref 4–11)

## 2018-05-23 PROCEDURE — 84443 ASSAY THYROID STIM HORMONE: CPT | Performed by: INTERNAL MEDICINE

## 2018-05-23 PROCEDURE — 85027 COMPLETE CBC AUTOMATED: CPT | Performed by: INTERNAL MEDICINE

## 2018-05-23 PROCEDURE — 99396 PREV VISIT EST AGE 40-64: CPT | Performed by: INTERNAL MEDICINE

## 2018-05-23 PROCEDURE — 36415 COLL VENOUS BLD VENIPUNCTURE: CPT | Performed by: INTERNAL MEDICINE

## 2018-05-23 PROCEDURE — 80053 COMPREHEN METABOLIC PANEL: CPT | Performed by: INTERNAL MEDICINE

## 2018-05-23 PROCEDURE — 80061 LIPID PANEL: CPT | Performed by: INTERNAL MEDICINE

## 2018-05-23 ASSESSMENT — PAIN SCALES - GENERAL: PAINLEVEL: MODERATE PAIN (4)

## 2018-05-23 NOTE — MR AVS SNAPSHOT
After Visit Summary   5/23/2018    Jim Marrero    MRN: [id number]           Patient Information     Date Of Birth          1975        Visit Information        Provider Department      5/23/2018 8:00 AM Edmund Haynes MD [facility]        Today's Diagnoses     Routine general medical examination at a health care facility    -  1    Right foot pain        CARDIOVASCULAR SCREENING; LDL GOAL LESS THAN 130          Care Instructions    Everything looks fine!    Refills of medications have been faxed to your pharmacy.     I'll get back to you with lab results soon, especially if there is anything of concern.      See you in a year, sooner if problems.            Follow-ups after your visit        Additional Services     PODIATRY/FOOT & ANKLE SURGERY REFERRAL       Your provider has referred you to: FM[facility]: [facility] [place] [phone]   [email or web address]  FM[facility]: [facility] [place] [facility]  [facility]  [phone]   [email or web address]    Please be aware that coverage of these services is subject to the terms and limitations of your health insurance plan.  Call member services at your health plan with any benefit or coverage questions.      Please bring the following to your appointment:  >>   Any x-rays, CTs or MRIs which have been performed.  Contact the facility where they were done to arrange for  prior to your scheduled appointment.    >>   List of current medications   >>   This referral request   >>   Any documents/labs given to you for this referral                  Who to contact     If you have questions or need follow up information about today's clinic visit or your schedule please contact [facility] directly at [phone].  Normal or non-critical lab and imaging results will be  "communicated to you by [employer], letter or phone within 4 business days after the clinic has received the results. If you do not hear from us within 7 days, please contact the clinic through [employer] or phone. If you have a critical or abnormal lab result, we will notify you by phone as soon as possible.  Submit refill requests through [employer] or call your pharmacy and they will forward the refill request to us. Please allow 3 business days for your refill to be completed.          Additional Information About Your Visit        Elect[employer] Database Information     [employer] gives you secure access to your electronic health record. If you see a primary care provider, you can also send messages to your care team and make appointments. If you have questions, please call your primary care clinic.  If you do not have a primary care provider, please call [phone] and they will assist you.        Care EveryWhere ID     This is your Care EveryWhere ID. This could be used by other organizations to access your [place] medical records  [id number]        Your Vitals Were     Pulse Temperature Respirations Height Pulse Oximetry BMI (Body Mass Index)    78 98.6  F (37  C) (Oral) 16 5' 9\" (1.753 m) 99% 32.41 kg/m2       Blood Pressure from Last 3 Encounters:   05/23/18 118/78   04/16/18 118/80   10/05/17 128/90    Weight from Last 3 Encounters:   05/23/18 219 lb 8 oz (99.6 kg)   04/16/18 224 lb (101.6 kg)   10/05/17 227 lb (103 kg)              We Performed the Following     CBC with platelets     Comprehensive metabolic panel     Lipid panel reflex to direct LDL Fasting     PODIATRY/FOOT & ANKLE SURGERY REFERRAL     TSH with free T4 reflex        Primary Care Provider Office Phone # Fax #    Edmund Haynes [phone]       [address] [facility] [address]  [facility] [address]        Equal Access to Services     LISHA KELLER AH: Hadii grayson careyo Solouann, waaxda luqadaha, qaybta kaalmamahin arrieta, holley gibbons " prateek medina ah. So [facility] [phone].    ATENCIÓN: Si habla español, tiene a ruiz disposición servicios gratuitos de asistencia lingüística. Kelin al [phone].    We comply with applicable federal civil rights laws and [place] laws. We do not discriminate on the basis of race, color, national origin, age, disability, sex, sexual orientation, or gender identity.            Thank you!     Thank you for choosing [facility]  for your care. Our goal is always to provide you with excellent care. Hearing back from our patients is one way we can continue to improve our services. Please take a few minutes to complete the written survey that you may receive in the mail after your visit with us. Thank you!             Your Updated Medication List - Protect others around you: Learn how to safely use, store and throw away your medicines at www.disposemymeds.org.      Notice  As of 5/23/2018  8:26 AM    You have not been prescribed any medications.

## 2018-05-23 NOTE — PATIENT INSTRUCTIONS
Everything looks fine!    Refills of medications have been faxed to your pharmacy.     I'll get back to you with lab results soon, especially if there is anything of concern.      See you in a year, sooner if problems.

## 2018-05-23 NOTE — PROGRESS NOTES
"SUBJECTIVE:   CC: Jim Marrero is an 43 year old male who presents for preventative health visit.     Fasting. Right foot pain x 1 week.    Physical   Annual:     Getting at least 3 servings of Calcium per day::  Yes    Bi-annual eye exam::  Yes    Dental care twice a year::  Yes    Sleep apnea or symptoms of sleep apnea::  None    Diet::  Regular (no restrictions)    Taking medications regularly::  Yes    Medication side effects::  None    Additional concerns today::  No            Right foot pain  Patient has been experiencing right foot pain for a week. He was playing football and running around when the pain started. He describes the pain as a cramping sensation. Notes that pain has not worsened or improved since onset. It will not hurt when at rest, only when walking or running. Notes pain along all of the sole of foot. He was taking tumeric for pain and notes that it gave him some palpitations so he d/c use. Palpitations subsided.     Diet and exercise  Patient no longer runs in the mornings, instead he walks daily.     Past/recent records reviewed and discussed for:  -No recent hospitalizations or surgeries  -Patient is moving to [place] this Friday.  -He reports not sleeping much in general  -Notes that \"glands\" under his neck tend to feel swollen at times.    -He reports having a lot of knee issues, he has had surgery on both of them already.     Today's PHQ-2 Score:   PHQ-2 ( 1999 [employer]) 5/23/2018   Q1: Little interest or pleasure in doing things 0   Q2: Feeling down, depressed or hopeless 0   PHQ-2 Score 0   Q1: Little interest or pleasure in doing things Not at all   Q2: Feeling down, depressed or hopeless Not at all   PHQ-2 Score 0     Abuse: Current or Past(Physical, Sexual or Emotional)- No  Do you feel safe in your environment - Yes    Social History   Substance Use Topics     Smoking status: Never Smoker     Smokeless tobacco: Never Used     Alcohol use 0.0 oz/week     0 Standard drinks or " "equivalent per week      Comment: 6-7 beers weekly     Alcohol Use 5/23/2018   If you drink alcohol do you typically have greater than 3 drinks per day OR greater than 7 drinks per week? No     Last PSA: No results found for: PSA    Reviewed orders with patient. Reviewed health maintenance and updated orders accordingly - Yes    Reviewed and updated as needed this visit by clinical staff  Tobacco  Allergies  Med Hx  Surg Hx  Fam Hx  Soc Hx        Reviewed and updated as needed this visit by Provider        Review of Systems  CONSTITUTIONAL: NEGATIVE for fever, chills, change in weight  INTEGUMENTARY/SKIN: NEGATIVE for worrisome rashes, moles or lesions  EYES: NEGATIVE for vision changes or irritation  ENT: NEGATIVE for ear, mouth and throat problems  RESP: NEGATIVE for significant cough or SOB  CV: NEGATIVE for chest pain, palpitations or peripheral edema  GI: NEGATIVE for nausea, abdominal pain, heartburn, or change in bowel habits   male: negative for dysuria, hematuria, decreased urinary stream, erectile dysfunction, urethral discharge  MUSCULOSKELETAL: POSITIVE for right foot pain NEGATIVE for significant arthralgias  NEURO: NEGATIVE for weakness, dizziness or paresthesias  PSYCHIATRIC: NEGATIVE for changes in mood or affect    This document serves as a record of the services and decisions personally performed and made by Edmund Haynes MD. It was created on his behalf by aMrina Whittington, a trained medical scribe. The creation of this document is based on the provider's statements to the medical scribe.  Marina Whittington May 23, 2018 8:05 AM     OBJECTIVE:   /78 (BP Location: Right arm, Patient Position: Chair, Cuff Size: Adult Large)  Pulse 78  Temp 98.6  F (37  C) (Oral)  Resp 16  Ht 5' 9\" (1.753 m)  Wt 219 lb 8 oz (99.6 kg)  SpO2 99%  BMI 32.41 kg/m2    Physical Exam  GENERAL: healthy, alert and no distress  EYES: Eyes grossly normal to inspection, PERRL and conjunctivae and sclerae " normal  HENT: ear canals and TM's normal, nose and mouth without ulcers or lesions  NECK: no adenopathy, no asymmetry, masses, or scars and thyroid normal to palpation  RESP: lungs clear to auscultation - no rales, rhonchi or wheezes  CV: regular rate and rhythm, normal S1 S2, no S3 or S4, no murmur, click or rub, no peripheral edema and peripheral pulses strong  ABDOMEN: soft, nontender, no hepatosplenomegaly, no masses and bowel sounds normal   (male): normal male genitalia without lesions or urethral discharge, no hernia  RECTAL: normal sphincter tone, no rectal masses, prostate normal size, smooth, nontender without nodules or masses  MS:Tenderness over plantar foot near calcaneus otherwise, no gross musculoskeletal defects noted, no edema  SKIN: no suspicious lesions or rashes  NEURO: Normal strength and tone, mentation intact and speech normal  PSYCH: mentation appears normal, affect normal/bright    ASSESSMENT/PLAN:   (Z00.00) Routine general medical examination at a health care facility  (primary encounter diagnosis)  Comment: Stable health. See epic orders.  Plan: Comprehensive metabolic panel, Lipid panel         reflex to direct LDL Fasting, TSH with free T4         reflex, CBC with platelets          (M79.671) Right foot pain  Comment: Recommend having good arch/heel support shoes or inserts. Advised patient that pain may resolve on its own, but suggested seeing podiatry if pain does not subside or improve.   Plan: PODIATRY/FOOT & ANKLE SURGERY REFERRAL          (Z13.6) CARDIOVASCULAR SCREENING; LDL GOAL LESS THAN 130  Plan: Comprehensive metabolic panel, Lipid panel         reflex to direct LDL Fasting          Everything looks fine!    Refills of medications have been faxed to your pharmacy.     I'll get back to you with lab results soon, especially if there is anything of concern.      See you in a year, sooner if problems.      COUNSELING:   Reviewed preventive health counseling, as reflected in  "patient instructions       Regular exercise       Healthy diet/nutrition     reports that he has never smoked. He has never used smokeless tobacco.  Estimated body mass index is 32.41 kg/(m^2) as calculated from the following:    Height as of this encounter: 5' 9\" (1.753 m).    Weight as of this encounter: 219 lb 8 oz (99.6 kg).   Weight management plan: Discussed healthy diet and exercise guidelines and patient will follow up in 12 months in clinic to re-evaluate.    Counseling Resources:  ATP IV Guidelines  Pooled Cohorts Equation Calculator  FRAX Risk Assessment  ICSI Preventive Guidelines  Dietary Guidelines for Americans, 2010  USDA's MyPlate  ASA Prophylaxis  Lung CA Screening    The information in this document, created by the medical scribe for me, accurately reflects the services I personally performed and the decisions made by me. I have reviewed and approved this document for accuracy prior to leaving the patient care area.  May 23, 2018 8:05 AM    Edmund Haynes MD  [facility]    Answers for HPI/ROS submitted by the patient on 5/23/2018   PHQ-2 Score: 0    "

## 2019-09-30 ENCOUNTER — HEALTH MAINTENANCE LETTER (OUTPATIENT)
Age: 44
End: 2019-09-30

## 2021-01-15 ENCOUNTER — HEALTH MAINTENANCE LETTER (OUTPATIENT)
Age: 46
End: 2021-01-15

## 2021-10-24 ENCOUNTER — HEALTH MAINTENANCE LETTER (OUTPATIENT)
Age: 46
End: 2021-10-24

## 2022-02-13 ENCOUNTER — HEALTH MAINTENANCE LETTER (OUTPATIENT)
Age: 47
End: 2022-02-13

## 2022-10-16 ENCOUNTER — HEALTH MAINTENANCE LETTER (OUTPATIENT)
Age: 47
End: 2022-10-16

## 2023-03-26 ENCOUNTER — HEALTH MAINTENANCE LETTER (OUTPATIENT)
Age: 48
End: 2023-03-26